# Patient Record
Sex: MALE | Race: WHITE | NOT HISPANIC OR LATINO | URBAN - NONMETROPOLITAN AREA
[De-identification: names, ages, dates, MRNs, and addresses within clinical notes are randomized per-mention and may not be internally consistent; named-entity substitution may affect disease eponyms.]

---
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
## 2020-05-06 ENCOUNTER — HOSPITAL ENCOUNTER (EMERGENCY)
Facility: HOSPITAL | Age: 20
Discharge: HOME | End: 2020-05-06
Attending: EMERGENCY MEDICINE | Admitting: EMERGENCY MEDICINE

## 2020-05-06 VITALS
TEMPERATURE: 97.9 F | RESPIRATION RATE: 20 BRPM | SYSTOLIC BLOOD PRESSURE: 143 MMHG | WEIGHT: 165 LBS | HEIGHT: 71 IN | DIASTOLIC BLOOD PRESSURE: 79 MMHG | BODY MASS INDEX: 23.1 KG/M2 | OXYGEN SATURATION: 98 % | HEART RATE: 85 BPM

## 2020-05-06 DIAGNOSIS — F15.10 METHAMPHETAMINE USE (HCC): Primary | ICD-10-CM

## 2020-05-06 LAB
ALBUMIN SERPL-MCNC: 5 G/DL (ref 3.5–5.2)
ALBUMIN/GLOB SERPL: 1.8 G/DL
ALP SERPL-CCNC: 93 U/L (ref 39–117)
ALT SERPL W P-5'-P-CCNC: 19 U/L (ref 1–41)
ANION GAP SERPL CALCULATED.3IONS-SCNC: 14 MMOL/L (ref 5–15)
APAP SERPL-MCNC: <5 MCG/ML (ref 10–30)
AST SERPL-CCNC: 23 U/L (ref 1–40)
BASOPHILS # BLD AUTO: 0.03 10*3/MM3 (ref 0–0.2)
BASOPHILS NFR BLD AUTO: 0.2 % (ref 0–1.5)
BILIRUB SERPL-MCNC: 3.3 MG/DL (ref 0.2–1.2)
BUN BLD-MCNC: 9 MG/DL (ref 6–20)
BUN/CREAT SERPL: 7.9 (ref 7–25)
CALCIUM SPEC-SCNC: 10.3 MG/DL (ref 8.6–10.5)
CHLORIDE SERPL-SCNC: 98 MMOL/L (ref 98–107)
CO2 SERPL-SCNC: 27 MMOL/L (ref 22–29)
CREAT BLD-MCNC: 1.14 MG/DL (ref 0.76–1.27)
DEPRECATED RDW RBC AUTO: 37.3 FL (ref 37–54)
EOSINOPHIL # BLD AUTO: 0.02 10*3/MM3 (ref 0–0.4)
EOSINOPHIL NFR BLD AUTO: 0.2 % (ref 0.3–6.2)
ERYTHROCYTE [DISTWIDTH] IN BLOOD BY AUTOMATED COUNT: 11.8 % (ref 12.3–15.4)
ETHANOL BLD-MCNC: <10 MG/DL (ref 0–10)
ETHANOL UR QL: <0.01 %
GFR SERPL CREATININE-BSD FRML MDRD: 80 ML/MIN/1.73
GLOBULIN UR ELPH-MCNC: 2.8 GM/DL
GLUCOSE BLD-MCNC: 96 MG/DL (ref 65–99)
HCT VFR BLD AUTO: 42.6 % (ref 37.5–51)
HGB BLD-MCNC: 15.4 G/DL (ref 13–17.7)
HOLD SPECIMEN: NORMAL
HOLD SPECIMEN: NORMAL
IMM GRANULOCYTES # BLD AUTO: 0.03 10*3/MM3 (ref 0–0.05)
IMM GRANULOCYTES NFR BLD AUTO: 0.2 % (ref 0–0.5)
LYMPHOCYTES # BLD AUTO: 1.54 10*3/MM3 (ref 0.7–3.1)
LYMPHOCYTES NFR BLD AUTO: 12.6 % (ref 19.6–45.3)
MCH RBC QN AUTO: 31.3 PG (ref 26.6–33)
MCHC RBC AUTO-ENTMCNC: 36.2 G/DL (ref 31.5–35.7)
MCV RBC AUTO: 86.6 FL (ref 79–97)
MONOCYTES # BLD AUTO: 0.85 10*3/MM3 (ref 0.1–0.9)
MONOCYTES NFR BLD AUTO: 7 % (ref 5–12)
NEUTROPHILS # BLD AUTO: 9.73 10*3/MM3 (ref 1.7–7)
NEUTROPHILS NFR BLD AUTO: 79.8 % (ref 42.7–76)
NRBC BLD AUTO-RTO: 0 /100 WBC (ref 0–0.2)
PLATELET # BLD AUTO: 228 10*3/MM3 (ref 140–450)
PMV BLD AUTO: 10.8 FL (ref 6–12)
POTASSIUM BLD-SCNC: 3.3 MMOL/L (ref 3.5–5.2)
PROT SERPL-MCNC: 7.8 G/DL (ref 6–8.5)
RBC # BLD AUTO: 4.92 10*6/MM3 (ref 4.14–5.8)
SALICYLATES SERPL-MCNC: <0.3 MG/DL
SODIUM BLD-SCNC: 139 MMOL/L (ref 136–145)
WBC NRBC COR # BLD: 12.2 10*3/MM3 (ref 3.4–10.8)
WHOLE BLOOD HOLD SPECIMEN: NORMAL
WHOLE BLOOD HOLD SPECIMEN: NORMAL

## 2020-05-06 PROCEDURE — 80053 COMPREHEN METABOLIC PANEL: CPT | Performed by: PHYSICIAN ASSISTANT

## 2020-05-06 PROCEDURE — 80307 DRUG TEST PRSMV CHEM ANLYZR: CPT | Performed by: PHYSICIAN ASSISTANT

## 2020-05-06 PROCEDURE — 99283 EMERGENCY DEPT VISIT LOW MDM: CPT

## 2020-05-06 PROCEDURE — 85025 COMPLETE CBC W/AUTO DIFF WBC: CPT | Performed by: PHYSICIAN ASSISTANT

## 2020-05-06 NOTE — ED NOTES
"Patient reports he is here because his sister brought him here. Spoke with patient's sister and was told that patient has been high for 4 days. Pt went to work today in his underwear because he believes his shoes have been chipped. Per pt's sister pt uses meth and was sober for over 2 years but recently relapsed after him and his wife . Pt is paranoid making statements that the  and FBI are watching him. Pt denies thoughts or harming self or others. When asked if patient feels safe at home patient stated, \"I'm never home.\"     Tamica Whitehead RN  05/06/20 4884    "

## 2020-05-07 NOTE — ED NOTES
Sister (Barbra) came to pick the brother to take home. Sister is upset because pt is not admitted. This nurse explain the patient don't meet the criteria. This nurse  explain that is referral to follow up to [facility].      Kofi Bourne, RN  05/06/20 2015

## 2020-05-07 NOTE — ED PROVIDER NOTES
"Subjective   Patient presents to emergency department for methamphetamine use, paranoia.  States he ingested some methamphetamine in the early hours of this morning over concern the police were \"coming to get him\".  States he was very intoxicated at first but is now asymptomatic other than worry about the police and drones \"shutting off the power to his house and internet\".  Denies any physical symptoms.            Review of Systems   Constitutional: Negative for chills and fever.   HENT: Negative for sore throat and trouble swallowing.    Eyes: Negative for visual disturbance.   Respiratory: Negative for shortness of breath and wheezing.    Cardiovascular: Negative for chest pain.   Gastrointestinal: Negative for abdominal pain, nausea and vomiting.   Endocrine: Negative for polyuria.   Genitourinary: Negative for dysuria and flank pain.   Musculoskeletal: Negative for back pain.   Skin: Negative for color change.   Allergic/Immunologic: Negative for immunocompromised state.   Neurological: Negative for headaches.   Psychiatric/Behavioral: Negative for confusion, dysphoric mood, hallucinations and suicidal ideas. The patient is nervous/anxious.        History reviewed. No pertinent past medical history.    No Known Allergies    History reviewed. No pertinent surgical history.    Family History   Problem Relation Age of Onset   • Thyroid disease Mother    • No Known Problems Father        Social History     Socioeconomic History   • Marital status: Single     Spouse name: Not on file   • Number of children: Not on file   • Years of education: Not on file   • Highest education level: Not on file   Tobacco Use   • Smoking status: Former Smoker   • Smokeless tobacco: Former User   Substance and Sexual Activity   • Alcohol use: No   • Drug use: No     Comment: states hx of iv drug abuse.            Objective      /79 (BP Location: Right arm, Patient Position: Sitting)   Pulse 85   Temp 97.9 °F (36.6 °C) " "(Temporal)   Resp 20   Ht 180.3 cm (71\")   Wt 74.8 kg (165 lb)   SpO2 98%   BMI 23.01 kg/m²     Physical Exam   Constitutional: He is oriented to person, place, and time. He appears well-developed and well-nourished. No distress.   HENT:   Head: Normocephalic and atraumatic.   Eyes: Conjunctivae are normal.   Cardiovascular: Normal rate, regular rhythm, normal heart sounds and intact distal pulses.   Pulmonary/Chest: Effort normal and breath sounds normal. No respiratory distress. He has no wheezes.   Abdominal: Soft. Bowel sounds are normal.   Neurological: He is alert and oriented to person, place, and time.   Skin: Skin is warm and dry.   Psychiatric: He has a normal mood and affect. His behavior is normal. Thought content normal.   paranoid   Nursing note and vitals reviewed.      Procedures           ED Course  ED Course as of May 06 2018   Wed May 06, 2020   2012 Labs unremarkable, physical exam benign, vital signs stable patient does not have any suicidal or homicidal ideation, auditory/visual hallucinations.  He is intoxicated with methamphetamine his admission.  No emergent medical condition observed.  Patient stable discharge home with a reliable adult picking him up.  Advised cessation of methamphetamine and gave resources to seek help for addiction.    [RAIZA]      ED Course User Index  [RAIZA] Julio Cesar Moore PA-C      Results for orders placed or performed during the hospital encounter of 05/06/20   Comprehensive Metabolic Panel   Result Value Ref Range    Glucose 96 65 - 99 mg/dL    BUN 9 6 - 20 mg/dL    Creatinine 1.14 0.76 - 1.27 mg/dL    Sodium 139 136 - 145 mmol/L    Potassium 3.3 (L) 3.5 - 5.2 mmol/L    Chloride 98 98 - 107 mmol/L    CO2 27.0 22.0 - 29.0 mmol/L    Calcium 10.3 8.6 - 10.5 mg/dL    Total Protein 7.8 6.0 - 8.5 g/dL    Albumin 5.00 3.50 - 5.20 g/dL    ALT (SGPT) 19 1 - 41 U/L    AST (SGOT) 23 1 - 40 U/L    Alkaline Phosphatase 93 39 - 117 U/L    Total Bilirubin 3.3 (H) 0.2 - 1.2 " mg/dL    eGFR Non African Amer 80 >60 mL/min/1.73    Globulin 2.8 gm/dL    A/G Ratio 1.8 g/dL    BUN/Creatinine Ratio 7.9 7.0 - 25.0    Anion Gap 14.0 5.0 - 15.0 mmol/L   Acetaminophen Level   Result Value Ref Range    Acetaminophen <5.0 (L) 10.0 - 30.0 mcg/mL   Ethanol   Result Value Ref Range    Ethanol <10 0 - 10 mg/dL    Ethanol % <0.010 %   Salicylate Level   Result Value Ref Range    Salicylate <0.3 <=30.0 mg/dL   CBC Auto Differential   Result Value Ref Range    WBC 12.20 (H) 3.40 - 10.80 10*3/mm3    RBC 4.92 4.14 - 5.80 10*6/mm3    Hemoglobin 15.4 13.0 - 17.7 g/dL    Hematocrit 42.6 37.5 - 51.0 %    MCV 86.6 79.0 - 97.0 fL    MCH 31.3 26.6 - 33.0 pg    MCHC 36.2 (H) 31.5 - 35.7 g/dL    RDW 11.8 (L) 12.3 - 15.4 %    RDW-SD 37.3 37.0 - 54.0 fl    MPV 10.8 6.0 - 12.0 fL    Platelets 228 140 - 450 10*3/mm3    Neutrophil % 79.8 (H) 42.7 - 76.0 %    Lymphocyte % 12.6 (L) 19.6 - 45.3 %    Monocyte % 7.0 5.0 - 12.0 %    Eosinophil % 0.2 (L) 0.3 - 6.2 %    Basophil % 0.2 0.0 - 1.5 %    Immature Grans % 0.2 0.0 - 0.5 %    Neutrophils, Absolute 9.73 (H) 1.70 - 7.00 10*3/mm3    Lymphocytes, Absolute 1.54 0.70 - 3.10 10*3/mm3    Monocytes, Absolute 0.85 0.10 - 0.90 10*3/mm3    Eosinophils, Absolute 0.02 0.00 - 0.40 10*3/mm3    Basophils, Absolute 0.03 0.00 - 0.20 10*3/mm3    Immature Grans, Absolute 0.03 0.00 - 0.05 10*3/mm3    nRBC 0.0 0.0 - 0.2 /100 WBC   Light Blue Top   Result Value Ref Range    Extra Tube hold for add-on    Green Top (Gel)   Result Value Ref Range    Extra Tube Hold for add-ons.    Lavender Top   Result Value Ref Range    Extra Tube hold for add-on    Gold Top - SST   Result Value Ref Range    Extra Tube Hold for add-ons.           Discussed results with patient.  Gave educational materials.  Advised close follow up for addiction sservices.  Return to emergency department for new or worsening symptoms.                                      MDM    Final diagnoses:   Methamphetamine use (CMS/HCC)                 Julio Cesar Moore PA-C  05/06/20 2018

## 2020-05-07 NOTE — ED NOTES
Pt unable to provide urine at this time. Provide water to the patient     Kofi Bourne RN  05/06/20 1920       Kofi Bourne RN  05/06/20 1929

## 2020-05-07 NOTE — ED NOTES
"Pt sister Barbra stated \"I am his sister and I work upstairs.  I know what the criteria is for being upstairs and I want to know the doctor and the nurse who took care of him and why he did not meet criteria.\"  I explained to the sister that it is a hippa violation to give out medical information even if you work here.  Pt was ready for discharge spoke to pt about his sister knowing information.  Pt Kofi RN nurse went spoke to sister together to explain to sister why being discharged.  Pt was taking to sister giving the information we gave him in discharge.  Pt sister upset.       Claudia Kiran, NUZHAT  05/06/20 2018       Claudia Kiran RN  05/06/20 2019    "

## 2022-12-19 ENCOUNTER — HOSPITAL ENCOUNTER (EMERGENCY)
Facility: HOSPITAL | Age: 22
Discharge: HOME | End: 2022-12-19
Attending: EMERGENCY MEDICINE | Admitting: EMERGENCY MEDICINE

## 2022-12-19 VITALS
HEIGHT: 70 IN | WEIGHT: 165 LBS | SYSTOLIC BLOOD PRESSURE: 120 MMHG | OXYGEN SATURATION: 100 % | TEMPERATURE: 97.7 F | BODY MASS INDEX: 23.62 KG/M2 | HEART RATE: 73 BPM | RESPIRATION RATE: 18 BRPM | DIASTOLIC BLOOD PRESSURE: 63 MMHG

## 2022-12-19 DIAGNOSIS — J02.0 STREP PHARYNGITIS: Primary | ICD-10-CM

## 2022-12-19 LAB
ALBUMIN SERPL-MCNC: 3.8 G/DL (ref 3.5–5.2)
ALBUMIN/GLOB SERPL: 1 G/DL
ALP SERPL-CCNC: 92 U/L (ref 39–117)
ALT SERPL W P-5'-P-CCNC: 13 U/L (ref 1–41)
ANION GAP SERPL CALCULATED.3IONS-SCNC: 7 MMOL/L (ref 5–15)
AST SERPL-CCNC: 16 U/L (ref 1–40)
BASOPHILS # BLD AUTO: 0.06 10*3/MM3 (ref 0–0.2)
BASOPHILS NFR BLD AUTO: 0.5 % (ref 0–1.5)
BILIRUB SERPL-MCNC: 0.8 MG/DL (ref 0–1.2)
BUN SERPL-MCNC: 11 MG/DL (ref 6–20)
BUN/CREAT SERPL: 10.6 (ref 7–25)
CALCIUM SPEC-SCNC: 9.1 MG/DL (ref 8.6–10.5)
CHLORIDE SERPL-SCNC: 104 MMOL/L (ref 98–107)
CO2 SERPL-SCNC: 27 MMOL/L (ref 22–29)
CREAT SERPL-MCNC: 1.04 MG/DL (ref 0.76–1.27)
DEPRECATED RDW RBC AUTO: 38.2 FL (ref 37–54)
EGFRCR SERPLBLD CKD-EPI 2021: 102.8 ML/MIN/1.73
EOSINOPHIL # BLD AUTO: 0.31 10*3/MM3 (ref 0–0.4)
EOSINOPHIL NFR BLD AUTO: 2.8 % (ref 0.3–6.2)
ERYTHROCYTE [DISTWIDTH] IN BLOOD BY AUTOMATED COUNT: 12 % (ref 12.3–15.4)
GLOBULIN UR ELPH-MCNC: 3.7 GM/DL
GLUCOSE SERPL-MCNC: 95 MG/DL (ref 65–99)
HCT VFR BLD AUTO: 40.9 % (ref 37.5–51)
HETEROPH AB SER QL LA: NEGATIVE
HGB BLD-MCNC: 13.9 G/DL (ref 13–17.7)
HOLD SPECIMEN: NORMAL
IMM GRANULOCYTES # BLD AUTO: 0.05 10*3/MM3 (ref 0–0.05)
IMM GRANULOCYTES NFR BLD AUTO: 0.5 % (ref 0–0.5)
LYMPHOCYTES # BLD AUTO: 1.46 10*3/MM3 (ref 0.7–3.1)
LYMPHOCYTES NFR BLD AUTO: 13.2 % (ref 19.6–45.3)
MCH RBC QN AUTO: 29.4 PG (ref 26.6–33)
MCHC RBC AUTO-ENTMCNC: 34 G/DL (ref 31.5–35.7)
MCV RBC AUTO: 86.7 FL (ref 79–97)
MONOCYTES # BLD AUTO: 0.73 10*3/MM3 (ref 0.1–0.9)
MONOCYTES NFR BLD AUTO: 6.6 % (ref 5–12)
NEUTROPHILS NFR BLD AUTO: 76.4 % (ref 42.7–76)
NEUTROPHILS NFR BLD AUTO: 8.49 10*3/MM3 (ref 1.7–7)
NRBC BLD AUTO-RTO: 0 /100 WBC (ref 0–0.2)
PLATELET # BLD AUTO: 308 10*3/MM3 (ref 140–450)
PMV BLD AUTO: 10.3 FL (ref 6–12)
POTASSIUM SERPL-SCNC: 3.8 MMOL/L (ref 3.5–5.2)
PROT SERPL-MCNC: 7.5 G/DL (ref 6–8.5)
RBC # BLD AUTO: 4.72 10*6/MM3 (ref 4.14–5.8)
S PYO AG THROAT QL: POSITIVE
SODIUM SERPL-SCNC: 138 MMOL/L (ref 136–145)
WBC NRBC COR # BLD: 11.1 10*3/MM3 (ref 3.4–10.8)
WHOLE BLOOD HOLD COAG: NORMAL

## 2022-12-19 PROCEDURE — 25010000002 DEXAMETHASONE PER 1 MG: Performed by: EMERGENCY MEDICINE

## 2022-12-19 PROCEDURE — 80053 COMPREHEN METABOLIC PANEL: CPT | Performed by: EMERGENCY MEDICINE

## 2022-12-19 PROCEDURE — 99283 EMERGENCY DEPT VISIT LOW MDM: CPT

## 2022-12-19 PROCEDURE — 85025 COMPLETE CBC W/AUTO DIFF WBC: CPT | Performed by: EMERGENCY MEDICINE

## 2022-12-19 PROCEDURE — 87880 STREP A ASSAY W/OPTIC: CPT | Performed by: EMERGENCY MEDICINE

## 2022-12-19 PROCEDURE — 96374 THER/PROPH/DIAG INJ IV PUSH: CPT

## 2022-12-19 PROCEDURE — 86308 HETEROPHILE ANTIBODY SCREEN: CPT | Performed by: EMERGENCY MEDICINE

## 2022-12-19 RX ORDER — DEXAMETHASONE SODIUM PHOSPHATE 4 MG/ML
4 INJECTION, SOLUTION INTRA-ARTICULAR; INTRALESIONAL; INTRAMUSCULAR; INTRAVENOUS; SOFT TISSUE ONCE
Status: COMPLETED | OUTPATIENT
Start: 2022-12-19 | End: 2022-12-19

## 2022-12-19 RX ORDER — SODIUM CHLORIDE 0.9 % (FLUSH) 0.9 %
10 SYRINGE (ML) INJECTION AS NEEDED
Status: DISCONTINUED | OUTPATIENT
Start: 2022-12-19 | End: 2022-12-19 | Stop reason: HOSPADM

## 2022-12-19 RX ORDER — DEXAMETHASONE 4 MG/1
4 TABLET ORAL 2 TIMES DAILY WITH MEALS
Qty: 10 TABLET | Refills: 0 | Status: SHIPPED | OUTPATIENT
Start: 2022-12-19

## 2022-12-19 RX ORDER — AMOXICILLIN 500 MG/1
500 CAPSULE ORAL 2 TIMES DAILY
Qty: 14 CAPSULE | Refills: 0 | Status: SHIPPED | OUTPATIENT
Start: 2022-12-19

## 2022-12-19 RX ADMIN — DEXAMETHASONE SODIUM PHOSPHATE 4 MG: 4 INJECTION, SOLUTION INTRA-ARTICULAR; INTRALESIONAL; INTRAMUSCULAR; INTRAVENOUS; SOFT TISSUE at 14:49

## 2022-12-19 RX ADMIN — SODIUM CHLORIDE 1000 ML: 9 INJECTION, SOLUTION INTRAVENOUS at 14:49

## 2022-12-19 NOTE — DISCHARGE INSTRUCTIONS
Drink plenty of cool liquids to soothe the throat and maintain hydration.  Return with any new or worsening symptoms, or any concerns.

## 2022-12-19 NOTE — ED PROVIDER NOTES
Subjective   History of Present Illness  Patient presents emergency department with complaint of sore throat, enlarged tonsils and difficulty swallowing for approximately 2 weeks.  Patient is currently handling his secretions.  Patient thought perhaps he had a strep throat though the illness is not improved over the past 2 weeks.  Patient notes no fevers during this time.  No known sick contacts.  No abdominal pain.  Patient works as a  in a construction crew.  He is not involved in contact sports.  No chronic medical problems.  No medications.  Patient arrives with normal vital signs.        Review of Systems   Constitutional: Positive for activity change, appetite change and fatigue. Negative for chills and fever.   HENT: Positive for sore throat. Negative for congestion, drooling and rhinorrhea.    Respiratory: Negative for cough and shortness of breath.    Gastrointestinal: Negative for diarrhea, nausea and vomiting.   Psychiatric/Behavioral: Negative for behavioral problems and confusion.   All other systems reviewed and are negative.      History reviewed. No pertinent past medical history.    No Known Allergies    History reviewed. No pertinent surgical history.    Family History   Problem Relation Age of Onset   • Thyroid disease Mother    • No Known Problems Father        Social History     Socioeconomic History   • Marital status:    Tobacco Use   • Smoking status: Former   • Smokeless tobacco: Former   Substance and Sexual Activity   • Alcohol use: No   • Drug use: No     Comment: states hx of iv drug abuse.            Objective   Physical Exam  Vitals and nursing note reviewed.   Constitutional:       General: He is not in acute distress.     Appearance: He is well-developed. He is not ill-appearing or diaphoretic.   HENT:      Head: Normocephalic and atraumatic.      Mouth/Throat:      Mouth: Mucous membranes are moist.      Pharynx: Pharyngeal swelling, oropharyngeal exudate and posterior  oropharyngeal erythema present.      Tonsils: Tonsillar exudate present. No tonsillar abscesses. 3+ on the right. 3+ on the left.   Eyes:      Conjunctiva/sclera: Conjunctivae normal.   Neck:      Vascular: No JVD.   Cardiovascular:      Rate and Rhythm: Normal rate and regular rhythm.      Heart sounds: Normal heart sounds. No murmur heard.    No friction rub. No gallop.   Pulmonary:      Effort: Pulmonary effort is normal. No respiratory distress.      Breath sounds: No wheezing or rales.   Chest:      Chest wall: No tenderness.   Abdominal:      General: Bowel sounds are normal. There is no distension.      Palpations: Abdomen is soft. There is no mass.      Tenderness: There is no abdominal tenderness. There is no guarding or rebound.   Musculoskeletal:         General: Normal range of motion.      Cervical back: Normal range of motion and neck supple.   Skin:     General: Skin is warm and dry.      Capillary Refill: Capillary refill takes less than 2 seconds.   Neurological:      General: No focal deficit present.      Mental Status: He is alert and oriented to person, place, and time.   Psychiatric:         Behavior: Behavior normal.         Thought Content: Thought content normal.         Judgment: Judgment normal.         Procedures           ED Course                                          Labs Reviewed   RAPID STREP A SCREEN - Abnormal; Notable for the following components:       Result Value    Strep A Ag Positive (*)     All other components within normal limits   CBC WITH AUTO DIFFERENTIAL - Abnormal; Notable for the following components:    WBC 11.10 (*)     RDW 12.0 (*)     Neutrophil % 76.4 (*)     Lymphocyte % 13.2 (*)     Neutrophils, Absolute 8.49 (*)     All other components within normal limits   MONONUCLEOSIS SCREEN - Normal   COMPREHENSIVE METABOLIC PANEL    Narrative:     GFR Normal >60  Chronic Kidney Disease <60  Kidney Failure <15     CBC AND DIFFERENTIAL    Narrative:     The following  orders were created for panel order CBC & Differential.  Procedure                               Abnormality         Status                     ---------                               -----------         ------                     CBC Auto Differential[[id number]]        Abnormal            Final result                 Please view results for these tests on the individual orders.   EXTRA TUBES    Narrative:     The following orders were created for panel order Extra Tubes.  Procedure                               Abnormality         Status                     ---------                               -----------         ------                     Gold Top - SST[[id number]]                                   Final result               Light Blue Top[[id number]]                                   Final result                 Please view results for these tests on the individual orders.   GOLD TOP - SST   LIGHT BLUE TOP       No orders to display           MDM  Number of Diagnoses or Management Options  Strep pharyngitis  Diagnosis management comments: Patient with signs symptoms consistent with strep pharyngitis.  There does not appear appear to be any asymmetry in the tonsillar region, there is exudate.  No uvular shift.  Patient requesting oral antibiotics versus the one-time shot.  Tolerating po, handling secretions.  Outpatient follow-up.       Amount and/or Complexity of Data Reviewed  Clinical lab tests: reviewed    Risk of Complications, Morbidity, and/or Mortality  Presenting problems: moderate    Patient Progress  Patient progress: stable      Final diagnoses:   Strep pharyngitis       ED Disposition  ED Disposition     ED Disposition   Discharge    Condition   Stable    Comment   --             [facility]  [address] [place] [id number]  On 12/21/2022  If not improved for further evaluation and care         Medication List      New Prescriptions    amoxicillin 500 MG capsule  Commonly  known as: AMOXIL  Take 1 capsule by mouth 2 (Two) Times a Day.     dexamethasone 4 MG tablet  Commonly known as: DECADRON  Take 1 tablet by mouth 2 (Two) Times a Day With Meals.           Where to Get Your Medications      These medications were sent to [facility]/pharmacy #[id number] - [place], [place] - [address] [place] [address] - [phone]  - [phone] [address] [address]    Phone: [phone]   · amoxicillin 500 MG capsule  · dexamethasone 4 MG tablet          Camilo Cunningham MD  12/19/22 3683